# Patient Record
(demographics unavailable — no encounter records)

---

## 2017-04-09 NOTE — ER DOCUMENT REPORT
HPI





- HPI


Patient complains to provider of: finger pain


Onset: Other - 4 days


Onset/Duration: Sudden


Quality of pain: Achy


Severity: Moderate


Pain Level: 3


Context: 


Patient presents emergency department with complaints of left middle finger 

pain.  Patient reports 4 days ago she had a superficial cut.  She does not 

remember what she cut herself on.  Recently she picked at it with a needle.  

Tonight she woke up hurting.  She has not taken anything for the pain.  She 

denies other symptoms such as fever vomiting diarrhea.


Associated Symptoms: None


Exacerbated by: Denies


Relieved by: Denies


Similar symptoms previously: No


Recently seen / treated by doctor: No





- REPRODUCTIVE


LMP: na





- DERM


Skin Color: Normal





Past Medical History





- General


Information source: Patient





- Social History


Smoking Status: Unknown if Ever Smoked


Cigarette use (# per day): No


Frequency of alcohol use: None


Drug Abuse: None


Lives with: Spouse/Significant other


Family History: None


Patient has suicidal ideation: No


Patient has homicidal ideation: No





- Past Medical History


Cardiac Medical History: Reports: Hx Hypertension


Renal/ Medical History: Denies: Hx Peritoneal Dialysis


Past Surgical History: Reports: Hx Hysterectomy





Vertical Provider Document





- CONSTITUTIONAL


Agree With Documented VS: Yes


Exam Limitations: No Limitations


General Appearance: WD/WN, No Apparent Distress





- INFECTION CONTROL


TRAVEL OUTSIDE OF THE [place] IN LAST 30 DAYS: No





- HEENT


HEENT: Atraumatic, Normocephalic





- NECK


Neck: Normal Inspection, Supple.  negative: Lymphadenopathy-Left, 

Lymphadenopathy-Right





- RESPIRATORY


Respiratory: Breath Sounds Normal, No Respiratory Distress


O2 Sat by Pulse Oximetry: 96





- CARDIOVASCULAR


Cardiovascular: Regular Rate





- MUSCULOSKELETAL/EXTREMETIES


Musculoskeletal/Extremeties: MAEW, FROM, Tender - erythema and slight swelling 

to left 3rd digit, no induration,no pustule, brisk cap refill, pip palmar area 

with area pt has picked at with needle, dorsal finger with slight swelling, no 

pustule





- NEURO


Level of Consciousness: Awake, Alert, Appropriate


Motor/Sensory: No Motor Deficit





- DERM


Integumentary: Warm, Dry





Course





- Re-evaluation


Re-evalutation: 





04/09/17 03:38


Patient instructed to monitor the finger return to the emergency department for 

increased swelling pain.  Patient primary care is OB/GYN.  She was instructed 

to follow up with primary care provider for recheck.





- Vital Signs


Vital signs: 


 











Temp Pulse Resp BP Pulse Ox


 


 98.5 F   69   18   157/79 H  96 


 


 04/09/17 03:10  04/09/17 03:10  04/09/17 03:10  04/09/17 03:10  04/09/17 03:10














Discharge





- Discharge


Clinical Impression: 


 Finger pain, left





Condition: Stable


Disposition: HOME, SELF-CARE


Instructions:  Soap Cleansing ([facility]), Cephalexin ([facility]), Anti-Inflammatory 

Medication ([facility])


Additional Instructions: 


*You have been treated for finger pain


*Take medication as prescribed


*Monitor the site for signs of increasing infection such as increasing pain,


  redness, swelling, warmth


*Keep the finger clean


*Take antiinflammatory for pain such as aleve or ibuprofen


*Follow up with a primary care provider within 5 days


*Return to ED for signs of increasing infection, worsening condition,


  changes, needs


Prescriptions: 


Cephalexin Monohydrate [Keflex 500 mg Capsule] 500 mg PO QID #20 capsule


Forms:  Elevated Blood Pressure

## 2017-11-17 NOTE — OPERATIVE REPORT
Operative Report


DATE OF SURGERY: 11/17/17


Operative Report: 





The risks, benefits and alternatives of the procedure including risks of 

bleeding, perforation requiring surgery are explained to the patient in detail 

and informed consent is obtained.  Patient is taken back to the operating room 

and placed in the left, lateral decubital position.  A timeout was called.  

Propofol medications administered.  A rectal examination was done which did not 

reveal any masses, tears or fissures.  An Olympus videoscope was inserted into 

the patient's rectum.  The scope was then carefully advanced all the way to the 

cecum.  The cecum was identified by the usual anatomical landmarks including 

the ileocecal valve as well as appendiceal office.  Photodocumentation is 

obtained.  Prep is good.  The scope was then sequentially pulled back via the 

various segments of the colon including the ascending colon, hepatic flexure, 

transverse colon, splenic flexure, descending colon finding to the rectosigmoid 

portions of the colon.  Retroflexion maneuvers performed.


PREOPERATIVE DIAGNOSIS: Blood in stool.


POSTOPERATIVE DIAGNOSIS: Ascending colon polypremoved via snare polypectomy.  

Random right-sided colon biopsies rule out lymphocytic, microscopic, 

collagenous colitis.  Diverticulosis.  Internal hemorrhoids


OPERATION: Colonoscopy with snare polypectomy.  Colonoscopy with biopsy


SURGEON: GUZMAN EGAN


ANESTHESIA: LMAC


TISSUE REMOVED OR ALTERED: As noted above.


COMPLICATIONS: 





None.


ESTIMATED BLOOD LOSS: None.


INTRAOPERATIVE FINDINGS: As noted above.


PROCEDURE: 





Patient tolerated procedure well.


No immediate postprocedure complications are noted.


Patient discharged in good condition.


Discharge date 11/17/2017.


Discharge diet: Regular.


Discharge activity: Regular.


2-3 week follow-up to discuss findings.


Patient is instructed to call the office or proceed to the emergency room 

should there be any further problems or questions.


We will await pathology.


3-5 year surveillance colonoscopy.